# Patient Record
Sex: FEMALE | Race: OTHER | NOT HISPANIC OR LATINO | URBAN - METROPOLITAN AREA
[De-identification: names, ages, dates, MRNs, and addresses within clinical notes are randomized per-mention and may not be internally consistent; named-entity substitution may affect disease eponyms.]

---

## 2021-03-09 ENCOUNTER — INPATIENT (INPATIENT)
Facility: HOSPITAL | Age: 81
LOS: 1 days | Discharge: HOME HEALTH | DRG: 689 | End: 2021-03-11
Attending: HOSPITALIST | Admitting: STUDENT IN AN ORGANIZED HEALTH CARE EDUCATION/TRAINING PROGRAM
Payer: COMMERCIAL

## 2021-03-09 VITALS
DIASTOLIC BLOOD PRESSURE: 65 MMHG | HEART RATE: 93 BPM | WEIGHT: 115.08 LBS | RESPIRATION RATE: 20 BRPM | HEIGHT: 62 IN | TEMPERATURE: 98 F | OXYGEN SATURATION: 100 % | SYSTOLIC BLOOD PRESSURE: 137 MMHG

## 2021-03-09 PROCEDURE — 71045 X-RAY EXAM CHEST 1 VIEW: CPT | Mod: 26

## 2021-03-09 PROCEDURE — 99285 EMERGENCY DEPT VISIT HI MDM: CPT

## 2021-03-09 NOTE — ED ADULT TRIAGE NOTE - MEANS OF ARRIVAL
Mom is here with brother's appt and is requesting refill on his Ritalin. See order.  They still have some left but since she is here can  Rx.   Nika Grossman MD   
stretcher

## 2021-03-09 NOTE — ED PROVIDER NOTE - ATTENDING CONTRIBUTION TO CARE
80 year old female with history of Parkinson disease presented to ED with generalized weakness. Uncleared if pt had a fall at home. Unable to reach son for collateral. Pt is reporting one week of lower abdominal pain, no fever, chills, nausea, vomiting, changes in bowel habits.  Also reports decreased appetitte.     PHYSICAL EXAMINATION:  VITALS REVIEWED.    GENERALIZED APPEARANCE:  Comfortable, resting comfortably   SKIN:  Dry, warm   HEAD:  No obvious scalp lesions  NECK:  Supple, non-tender, no midline tenderness   CHEST AND RESPIRATORY:  Clear to auscultation B/L, good air entry B/L, equal chest expansion  HEART AND CARDIOVASCULAR:  Regular rate, no obvious murmur  ABDOMEN AND GI:  Soft, bilateral lower abdomen tenderness  EXTREMITIES:  No deformity, edema, or calf tenderness  NEURO: AAOx2, gross motor and sensory intact  PSYCH: Normal affect     Impression: Likely UTI vs colitis. Low suspicion for ACS. Plan: labs, CT head, CT a/p reassess

## 2021-03-09 NOTE — ED PROVIDER NOTE - OBJECTIVE STATEMENT
80F with PMH parkinsons disease presenting with weakness. Patient reports that she fell but does not remember how she fell, LOC, or if she hit her head. Patient reports she has felt weak since last night. Per EMS, patient has been in bed all day and last time patient had similar symptoms was in January for which she was prescribed abx to treat UTI. Patient reports chills, no HA, SOB, abdominal pain, n/v/c/d. 80F with PMH parkinsons disease presenting with weakness. Patient reports that she fell but does not remember how she fell, LOC, or if she hit her head. Patient reports she has felt weak since last night. Per EMS, patient has been in bed all day and last time patient had similar symptoms was in January for which she was prescribed abx to treat UTI. Patient reports chills, lower abdominal pain and hip pain bilaterallym no HA, SOB, n/v/c/d. 80F with PMH parkinsons disease presenting with weakness. Patient reports that she fell but does not remember how she fell, LOC, or if she hit her head. Patient reports she has felt weak since last night. Per EMS, patient has been in bed all day and last time patient had similar symptoms was in January for which she was prescribed abx to treat UTI. Patient reports chills, lower abdominal pain and hip pain bilaterallym no HA, SOB, n/v/c/d.    Attempted to call son Landen to obtain collateral but unable to reach.

## 2021-03-09 NOTE — ED PROVIDER NOTE - CLINICAL SUMMARY MEDICAL DECISION MAKING FREE TEXT BOX
80F with PMH parkinsons presenting with weakness. Patient reports she fell, but per EMS patient has been in bed for last 24 hours. Will obtain labs, ekg, imaging, re-assess

## 2021-03-09 NOTE — ED PROVIDER NOTE - MUSCULOSKELETAL NEGATIVE STATEMENT, MLM
no back pain, no gout, no musculoskeletal pain, no neck pain, and no weakness. + hip pain BL; no back pain, no gout, no musculoskeletal pain, no neck pain, and no weakness.

## 2021-03-10 DIAGNOSIS — R31.1 BENIGN ESSENTIAL MICROSCOPIC HEMATURIA: ICD-10-CM

## 2021-03-10 DIAGNOSIS — G20 PARKINSON'S DISEASE: ICD-10-CM

## 2021-03-10 DIAGNOSIS — Z29.9 ENCOUNTER FOR PROPHYLACTIC MEASURES, UNSPECIFIED: ICD-10-CM

## 2021-03-10 DIAGNOSIS — F03.90 UNSPECIFIED DEMENTIA WITHOUT BEHAVIORAL DISTURBANCE: ICD-10-CM

## 2021-03-10 DIAGNOSIS — R41.0 DISORIENTATION, UNSPECIFIED: ICD-10-CM

## 2021-03-10 LAB
24R-OH-CALCIDIOL SERPL-MCNC: 30.3 NG/ML — SIGNIFICANT CHANGE UP (ref 30–80)
A1C WITH ESTIMATED AVERAGE GLUCOSE RESULT: 5.7 % — HIGH (ref 4–5.6)
ALBUMIN SERPL ELPH-MCNC: 4.1 G/DL — SIGNIFICANT CHANGE UP (ref 3.3–5)
ALBUMIN SERPL ELPH-MCNC: 4.4 G/DL — SIGNIFICANT CHANGE UP (ref 3.3–5)
ALP SERPL-CCNC: 50 U/L — SIGNIFICANT CHANGE UP (ref 40–120)
ALP SERPL-CCNC: 51 U/L — SIGNIFICANT CHANGE UP (ref 40–120)
ALT FLD-CCNC: 8 U/L — LOW (ref 10–45)
ALT FLD-CCNC: 8 U/L — LOW (ref 10–45)
ANION GAP SERPL CALC-SCNC: 12 MMOL/L — SIGNIFICANT CHANGE UP (ref 5–17)
ANION GAP SERPL CALC-SCNC: 13 MMOL/L — SIGNIFICANT CHANGE UP (ref 5–17)
APPEARANCE UR: CLEAR — SIGNIFICANT CHANGE UP
AST SERPL-CCNC: 11 U/L — SIGNIFICANT CHANGE UP (ref 10–40)
AST SERPL-CCNC: 14 U/L — SIGNIFICANT CHANGE UP (ref 10–40)
BACTERIA # UR AUTO: ABNORMAL
BASOPHILS # BLD AUTO: 0.02 K/UL — SIGNIFICANT CHANGE UP (ref 0–0.2)
BASOPHILS # BLD AUTO: 0.03 K/UL — SIGNIFICANT CHANGE UP (ref 0–0.2)
BASOPHILS NFR BLD AUTO: 0.2 % — SIGNIFICANT CHANGE UP (ref 0–2)
BASOPHILS NFR BLD AUTO: 0.3 % — SIGNIFICANT CHANGE UP (ref 0–2)
BILIRUB SERPL-MCNC: 0.5 MG/DL — SIGNIFICANT CHANGE UP (ref 0.2–1.2)
BILIRUB SERPL-MCNC: 0.6 MG/DL — SIGNIFICANT CHANGE UP (ref 0.2–1.2)
BILIRUB UR-MCNC: NEGATIVE — SIGNIFICANT CHANGE UP
BUN SERPL-MCNC: 18 MG/DL — SIGNIFICANT CHANGE UP (ref 7–23)
BUN SERPL-MCNC: 21 MG/DL — SIGNIFICANT CHANGE UP (ref 7–23)
CALCIUM SERPL-MCNC: 10.3 MG/DL — SIGNIFICANT CHANGE UP (ref 8.4–10.5)
CALCIUM SERPL-MCNC: 9.7 MG/DL — SIGNIFICANT CHANGE UP (ref 8.4–10.5)
CHLORIDE SERPL-SCNC: 103 MMOL/L — SIGNIFICANT CHANGE UP (ref 96–108)
CHLORIDE SERPL-SCNC: 106 MMOL/L — SIGNIFICANT CHANGE UP (ref 96–108)
CO2 SERPL-SCNC: 21 MMOL/L — LOW (ref 22–31)
CO2 SERPL-SCNC: 25 MMOL/L — SIGNIFICANT CHANGE UP (ref 22–31)
COLOR SPEC: YELLOW — SIGNIFICANT CHANGE UP
CREAT SERPL-MCNC: 0.66 MG/DL — SIGNIFICANT CHANGE UP (ref 0.5–1.3)
CREAT SERPL-MCNC: 0.79 MG/DL — SIGNIFICANT CHANGE UP (ref 0.5–1.3)
DIFF PNL FLD: NEGATIVE — SIGNIFICANT CHANGE UP
EOSINOPHIL # BLD AUTO: 0.04 K/UL — SIGNIFICANT CHANGE UP (ref 0–0.5)
EOSINOPHIL # BLD AUTO: 0.13 K/UL — SIGNIFICANT CHANGE UP (ref 0–0.5)
EOSINOPHIL NFR BLD AUTO: 0.4 % — SIGNIFICANT CHANGE UP (ref 0–6)
EOSINOPHIL NFR BLD AUTO: 1.3 % — SIGNIFICANT CHANGE UP (ref 0–6)
EPI CELLS # UR: 1 — SIGNIFICANT CHANGE UP
ESTIMATED AVERAGE GLUCOSE: 117 MG/DL — HIGH (ref 68–114)
GAS PNL BLDV: SIGNIFICANT CHANGE UP
GLUCOSE SERPL-MCNC: 82 MG/DL — SIGNIFICANT CHANGE UP (ref 70–99)
GLUCOSE SERPL-MCNC: 92 MG/DL — SIGNIFICANT CHANGE UP (ref 70–99)
GLUCOSE UR QL: NEGATIVE — SIGNIFICANT CHANGE UP
HCT VFR BLD CALC: 39.3 % — SIGNIFICANT CHANGE UP (ref 34.5–45)
HCT VFR BLD CALC: 41.9 % — SIGNIFICANT CHANGE UP (ref 34.5–45)
HGB BLD-MCNC: 12.9 G/DL — SIGNIFICANT CHANGE UP (ref 11.5–15.5)
HGB BLD-MCNC: 13.5 G/DL — SIGNIFICANT CHANGE UP (ref 11.5–15.5)
HIV 1+2 AB+HIV1 P24 AG SERPL QL IA: SIGNIFICANT CHANGE UP
HYALINE CASTS # UR AUTO: 1 /LPF — SIGNIFICANT CHANGE UP (ref 0–7)
IMM GRANULOCYTES NFR BLD AUTO: 0.4 % — SIGNIFICANT CHANGE UP (ref 0–1.5)
IMM GRANULOCYTES NFR BLD AUTO: 0.5 % — SIGNIFICANT CHANGE UP (ref 0–1.5)
KETONES UR-MCNC: ABNORMAL
LACTATE BLDV-MCNC: 1.3 MMOL/L — SIGNIFICANT CHANGE UP (ref 0.7–2)
LEUKOCYTE ESTERASE UR-ACNC: NEGATIVE — SIGNIFICANT CHANGE UP
LYMPHOCYTES # BLD AUTO: 2.61 K/UL — SIGNIFICANT CHANGE UP (ref 1–3.3)
LYMPHOCYTES # BLD AUTO: 28.6 % — SIGNIFICANT CHANGE UP (ref 13–44)
LYMPHOCYTES # BLD AUTO: 3.39 K/UL — HIGH (ref 1–3.3)
LYMPHOCYTES # BLD AUTO: 33.6 % — SIGNIFICANT CHANGE UP (ref 13–44)
MAGNESIUM SERPL-MCNC: 2.2 MG/DL — SIGNIFICANT CHANGE UP (ref 1.6–2.6)
MCHC RBC-ENTMCNC: 31.5 PG — SIGNIFICANT CHANGE UP (ref 27–34)
MCHC RBC-ENTMCNC: 32.2 GM/DL — SIGNIFICANT CHANGE UP (ref 32–36)
MCHC RBC-ENTMCNC: 32.2 PG — SIGNIFICANT CHANGE UP (ref 27–34)
MCHC RBC-ENTMCNC: 32.8 GM/DL — SIGNIFICANT CHANGE UP (ref 32–36)
MCV RBC AUTO: 97.9 FL — SIGNIFICANT CHANGE UP (ref 80–100)
MCV RBC AUTO: 98 FL — SIGNIFICANT CHANGE UP (ref 80–100)
MONOCYTES # BLD AUTO: 0.45 K/UL — SIGNIFICANT CHANGE UP (ref 0–0.9)
MONOCYTES # BLD AUTO: 0.57 K/UL — SIGNIFICANT CHANGE UP (ref 0–0.9)
MONOCYTES NFR BLD AUTO: 4.9 % — SIGNIFICANT CHANGE UP (ref 2–14)
MONOCYTES NFR BLD AUTO: 5.6 % — SIGNIFICANT CHANGE UP (ref 2–14)
NEUTROPHILS # BLD AUTO: 5.93 K/UL — SIGNIFICANT CHANGE UP (ref 1.8–7.4)
NEUTROPHILS # BLD AUTO: 5.97 K/UL — SIGNIFICANT CHANGE UP (ref 1.8–7.4)
NEUTROPHILS NFR BLD AUTO: 58.7 % — SIGNIFICANT CHANGE UP (ref 43–77)
NEUTROPHILS NFR BLD AUTO: 65.5 % — SIGNIFICANT CHANGE UP (ref 43–77)
NITRITE UR-MCNC: POSITIVE
NRBC # BLD: 0 /100 WBCS — SIGNIFICANT CHANGE UP (ref 0–0)
NRBC # BLD: 0 /100 WBCS — SIGNIFICANT CHANGE UP (ref 0–0)
PH UR: 6 — SIGNIFICANT CHANGE UP (ref 5–8)
PHOSPHATE SERPL-MCNC: 2.9 MG/DL — SIGNIFICANT CHANGE UP (ref 2.5–4.5)
PLATELET # BLD AUTO: 216 K/UL — SIGNIFICANT CHANGE UP (ref 150–400)
PLATELET # BLD AUTO: 218 K/UL — SIGNIFICANT CHANGE UP (ref 150–400)
POTASSIUM SERPL-MCNC: 3.9 MMOL/L — SIGNIFICANT CHANGE UP (ref 3.5–5.3)
POTASSIUM SERPL-MCNC: 4.2 MMOL/L — SIGNIFICANT CHANGE UP (ref 3.5–5.3)
POTASSIUM SERPL-SCNC: 3.9 MMOL/L — SIGNIFICANT CHANGE UP (ref 3.5–5.3)
POTASSIUM SERPL-SCNC: 4.2 MMOL/L — SIGNIFICANT CHANGE UP (ref 3.5–5.3)
PROT SERPL-MCNC: 7.6 G/DL — SIGNIFICANT CHANGE UP (ref 6–8.3)
PROT SERPL-MCNC: 7.8 G/DL — SIGNIFICANT CHANGE UP (ref 6–8.3)
PROT UR-MCNC: ABNORMAL
RBC # BLD: 4.01 M/UL — SIGNIFICANT CHANGE UP (ref 3.8–5.2)
RBC # BLD: 4.28 M/UL — SIGNIFICANT CHANGE UP (ref 3.8–5.2)
RBC # FLD: 14.7 % — HIGH (ref 10.3–14.5)
RBC # FLD: 14.8 % — HIGH (ref 10.3–14.5)
RBC CASTS # UR COMP ASSIST: 3 /HPF — SIGNIFICANT CHANGE UP (ref 0–4)
SARS-COV-2 IGG SERPL QL IA: NEGATIVE — SIGNIFICANT CHANGE UP
SARS-COV-2 IGM SERPL IA-ACNC: 0.06 INDEX — SIGNIFICANT CHANGE UP
SARS-COV-2 RNA SPEC QL NAA+PROBE: SIGNIFICANT CHANGE UP
SODIUM SERPL-SCNC: 139 MMOL/L — SIGNIFICANT CHANGE UP (ref 135–145)
SODIUM SERPL-SCNC: 141 MMOL/L — SIGNIFICANT CHANGE UP (ref 135–145)
SP GR SPEC: 1.05 — HIGH (ref 1.01–1.02)
TROPONIN T, HIGH SENSITIVITY RESULT: 19 NG/L — SIGNIFICANT CHANGE UP (ref 0–51)
TROPONIN T, HIGH SENSITIVITY RESULT: 20 NG/L — SIGNIFICANT CHANGE UP (ref 0–51)
TSH SERPL-MCNC: 0.85 UIU/ML — SIGNIFICANT CHANGE UP (ref 0.27–4.2)
UROBILINOGEN FLD QL: NEGATIVE — SIGNIFICANT CHANGE UP
VIT B12 SERPL-MCNC: 540 PG/ML — SIGNIFICANT CHANGE UP (ref 232–1245)
WBC # BLD: 10.1 K/UL — SIGNIFICANT CHANGE UP (ref 3.8–10.5)
WBC # BLD: 9.13 K/UL — SIGNIFICANT CHANGE UP (ref 3.8–10.5)
WBC # FLD AUTO: 10.1 K/UL — SIGNIFICANT CHANGE UP (ref 3.8–10.5)
WBC # FLD AUTO: 9.13 K/UL — SIGNIFICANT CHANGE UP (ref 3.8–10.5)
WBC UR QL: 3 /HPF — SIGNIFICANT CHANGE UP (ref 0–5)

## 2021-03-10 PROCEDURE — 70450 CT HEAD/BRAIN W/O DYE: CPT | Mod: 26,ME

## 2021-03-10 PROCEDURE — G1004: CPT

## 2021-03-10 PROCEDURE — 74177 CT ABD & PELVIS W/CONTRAST: CPT | Mod: 26,ME

## 2021-03-10 PROCEDURE — 99223 1ST HOSP IP/OBS HIGH 75: CPT | Mod: GC

## 2021-03-10 RX ORDER — SODIUM CHLORIDE 9 MG/ML
1000 INJECTION INTRAMUSCULAR; INTRAVENOUS; SUBCUTANEOUS
Refills: 0 | Status: COMPLETED | OUTPATIENT
Start: 2021-03-10 | End: 2021-03-10

## 2021-03-10 RX ORDER — AMANTADINE HCL 100 MG
2 CAPSULE ORAL
Qty: 0 | Refills: 0 | DISCHARGE

## 2021-03-10 RX ORDER — RIVASTIGMINE 4.6 MG/24H
1 PATCH, EXTENDED RELEASE TRANSDERMAL
Qty: 0 | Refills: 0 | DISCHARGE

## 2021-03-10 RX ORDER — CARBIDOPA AND LEVODOPA 25; 100 MG/1; MG/1
1 TABLET ORAL
Qty: 0 | Refills: 0 | DISCHARGE

## 2021-03-10 RX ORDER — ENTACAPONE 200 MG/1
200 TABLET, FILM COATED ORAL THREE TIMES A DAY
Refills: 0 | Status: DISCONTINUED | OUTPATIENT
Start: 2021-03-10 | End: 2021-03-11

## 2021-03-10 RX ORDER — CARBIDOPA AND LEVODOPA 25; 100 MG/1; MG/1
1 TABLET ORAL THREE TIMES A DAY
Refills: 0 | Status: DISCONTINUED | OUTPATIENT
Start: 2021-03-10 | End: 2021-03-11

## 2021-03-10 RX ORDER — AMANTADINE HCL 100 MG
200 CAPSULE ORAL
Refills: 0 | Status: DISCONTINUED | OUTPATIENT
Start: 2021-03-10 | End: 2021-03-11

## 2021-03-10 RX ORDER — QUETIAPINE FUMARATE 200 MG/1
25 TABLET, FILM COATED ORAL AT BEDTIME
Refills: 0 | Status: DISCONTINUED | OUTPATIENT
Start: 2021-03-10 | End: 2021-03-10

## 2021-03-10 RX ORDER — CEFTRIAXONE 500 MG/1
1000 INJECTION, POWDER, FOR SOLUTION INTRAMUSCULAR; INTRAVENOUS EVERY 24 HOURS
Refills: 0 | Status: DISCONTINUED | OUTPATIENT
Start: 2021-03-10 | End: 2021-03-11

## 2021-03-10 RX ORDER — SODIUM CHLORIDE 9 MG/ML
1000 INJECTION INTRAMUSCULAR; INTRAVENOUS; SUBCUTANEOUS ONCE
Refills: 0 | Status: COMPLETED | OUTPATIENT
Start: 2021-03-10 | End: 2021-03-10

## 2021-03-10 RX ORDER — ENOXAPARIN SODIUM 100 MG/ML
40 INJECTION SUBCUTANEOUS DAILY
Refills: 0 | Status: DISCONTINUED | OUTPATIENT
Start: 2021-03-10 | End: 2021-03-10

## 2021-03-10 RX ORDER — ENTACAPONE 200 MG/1
1 TABLET, FILM COATED ORAL
Qty: 0 | Refills: 0 | DISCHARGE

## 2021-03-10 RX ORDER — ENTACAPONE 200 MG/1
0 TABLET, FILM COATED ORAL
Qty: 0 | Refills: 0 | DISCHARGE

## 2021-03-10 RX ORDER — ENOXAPARIN SODIUM 100 MG/ML
40 INJECTION SUBCUTANEOUS DAILY
Refills: 0 | Status: DISCONTINUED | OUTPATIENT
Start: 2021-03-10 | End: 2021-03-11

## 2021-03-10 RX ORDER — LANOLIN ALCOHOL/MO/W.PET/CERES
3 CREAM (GRAM) TOPICAL AT BEDTIME
Refills: 0 | Status: DISCONTINUED | OUTPATIENT
Start: 2021-03-10 | End: 2021-03-11

## 2021-03-10 RX ADMIN — ENTACAPONE 200 MILLIGRAM(S): 200 TABLET, FILM COATED ORAL at 22:11

## 2021-03-10 RX ADMIN — ENOXAPARIN SODIUM 40 MILLIGRAM(S): 100 INJECTION SUBCUTANEOUS at 16:34

## 2021-03-10 RX ADMIN — CARBIDOPA AND LEVODOPA 1 TABLET(S): 25; 100 TABLET ORAL at 22:12

## 2021-03-10 RX ADMIN — SODIUM CHLORIDE 75 MILLILITER(S): 9 INJECTION INTRAMUSCULAR; INTRAVENOUS; SUBCUTANEOUS at 11:20

## 2021-03-10 RX ADMIN — CEFTRIAXONE 100 MILLIGRAM(S): 500 INJECTION, POWDER, FOR SOLUTION INTRAMUSCULAR; INTRAVENOUS at 05:45

## 2021-03-10 RX ADMIN — Medication 3 MILLIGRAM(S): at 22:06

## 2021-03-10 RX ADMIN — Medication 200 MILLIGRAM(S): at 23:45

## 2021-03-10 RX ADMIN — SODIUM CHLORIDE 1000 MILLILITER(S): 9 INJECTION INTRAMUSCULAR; INTRAVENOUS; SUBCUTANEOUS at 02:49

## 2021-03-10 NOTE — PHYSICAL THERAPY INITIAL EVALUATION ADULT - DISCHARGE DISPOSITION, PT EVAL
for balance, gait and strengthening and assistance for ALL functional mobility/activities/home w/ home PT

## 2021-03-10 NOTE — H&P ADULT - ATTENDING COMMENTS
Patient seen and examined at bedside, d/w resident and agree with plan as above.  79 yo F w/ PMH of advanced Parkinsons disease, dementia (dependent on ADLs), HLD, a/w metabolic encephalopathy and weakness likely due to UTI.   c/w ceftriaxone (started 3/9) for UTI, f/u Urine culture  follow up blood culture  No other focal source, no cough/fever/no WBC - CXR clear  PD - c/w medications as above   presents to the ED due to lethargy. UA + for few bacteria and nitrites. Patient also on multiple sedating medications.  Metabolic encephalopathy - more awake this morning after fluids/antibiotics, supportive care  Dysphagia - failed bedside eval initially, however now more awake, will d/w family goals for feeding but would favor starting careful hand feeding with aspiration precautions.  Will d/w son regarding advanced directives/goals of care Patient seen and examined at bedside, d/w resident and agree with plan as above.  81 yo F w/ PMH of advanced Parkinsons disease, dementia (dependent on ADLs), HLD, a/w metabolic encephalopathy and weakness likely due to UTI.   c/w ceftriaxone (started 3/9) for UTI, f/u Urine culture  follow up blood culture  No other focal source, no cough/fever/no WBC - CXR clear  PD - c/w medications as above   presents to the ED due to lethargy. UA + for few bacteria and nitrites. Patient also on multiple sedating medications.  Dementia with behavioral symptoms - has been calm here, but given lethargy, consider decreasing PM dose to 25mg and not 50.   Metabolic encephalopathy - more awake this morning after fluids/antibiotics, supportive care  Dysphagia - failed bedside eval initially, however now more awake, will d/w family goals for feeding but would favor starting careful hand feeding with aspiration precautions.  Will d/w son regarding advanced directives/goals of care Patient seen and examined at bedside, d/w resident and agree with plan as above.  81 yo F w/ PMH of advanced Parkinsons disease, dementia (dependent on ADLs), HLD, a/w metabolic encephalopathy and weakness likely due to UTI.   c/w ceftriaxone (started 3/9) for UTI, f/u Urine culture  follow up blood culture  No other focal source, no cough/fever/no WBC - CXR clear  PD - c/w medications as above   presents to the ED due to lethargy. UA + for few bacteria and nitrites. Patient also on multiple sedating medications.  Dementia with behavioral symptoms - has been calm here, but given lethargy, consider decreasing PM dose to 25mg and not 50.   Metabolic encephalopathy - more awake this morning after fluids/antibiotics, supportive care  Dysphagia - failed bedside eval initially, however now more awake, will d/w family goals for feeding but would favor starting careful hand feeding with aspiration precautions.  Will d/w son regarding advanced directives/goals of care  Given frailty, poor skin turgor - high risk of pressure ulcers - frequent turning and positioning q2hrs. Patient seen and examined at bedside, d/w resident and agree with plan as above.  81 yo F w/ PMH of advanced Parkinsons disease, dementia (dependent on ADLs), HLD, a/w metabolic encephalopathy and weakness likely due to UTI.   c/w ceftriaxone (started 3/9) for UTI, f/u Urine culture  follow up blood culture  No other focal source, no cough/fever/no WBC - CXR clear  PD - c/w home medications - entacapone, amantadine, sinemet   presents to the ED due to lethargy. UA + for few bacteria and nitrites. Patient also on multiple sedating medications.  Dementia with behavioral symptoms - has been calm here, but given lethargy, consider decreasing PM dose to 25mg and not 50.   Metabolic encephalopathy - more awake this morning after fluids/antibiotics, supportive care  Dysphagia - failed bedside eval initially, however now more awake, will d/w family goals for feeding but would favor starting careful hand feeding with aspiration precautions.  Will d/w son regarding advanced directives/goals of care  Given frailty, poor skin turgor - high risk of pressure ulcers - frequent turning and positioning q2hrs.

## 2021-03-10 NOTE — H&P ADULT - NSHPLABSRESULTS_GEN_ALL_CORE
Labs:                        12.9   9.13  )-----------( 216      ( 10 Mar 2021 08:52 )             39.3     Auto Eosinophil # 0.04  / Auto Eosinophil % 0.4   / Auto Neutrophil # 5.97  / Auto Neutrophil % 65.5  / BANDS % x                            13.5   10.10 )-----------( 218      ( 09 Mar 2021 23:56 )             41.9     Auto Eosinophil # 0.13  / Auto Eosinophil % 1.3   / Auto Neutrophil # 5.93  / Auto Neutrophil % 58.7  / BANDS % x        Hgb Trend: 12.9<--, 13.5<--    03-10    139  |  106  |  18  ----------------------------<  82  4.2   |  21<L>  |  0.66  03-09    141  |  103  |  21  ----------------------------<  92  3.9   |  25  |  0.79    Ca    9.7      10 Mar 2021 08:52  Mg     2.2     03-10  Phos  2.9     03-10  TPro  7.6  /  Alb  4.1  /  TBili  0.5  /  DBili  x   /  AST  14  /  ALT  8<L>  /  AlkPhos  51  03-10  TPro  7.8  /  Alb  4.4  /  TBili  0.6  /  DBili  x   /  AST  11  /  ALT  8<L>  /  AlkPhos  50  03-09    Creatinine Trend: 0.66<--, 0.79<--        Urinalysis Basic - ( 10 Mar 2021 03:08 )    Color: Yellow / Appearance: Clear / S.050 / pH: x  Gluc: x / Ketone: Small  / Bili: Negative / Urobili: Negative   Blood: x / Protein: Trace / Nitrite: Positive   Leuk Esterase: Negative / RBC: 3 /hpf / WBC 3 /HPF   Sq Epi: x / Non Sq Epi: 1 / Bacteria: Few        ABG:   VENT:       Labs result reviewed by me.

## 2021-03-10 NOTE — H&P ADULT - NSHPREVIEWOFSYSTEMS_GEN_ALL_CORE
CONSTITUTIONAL:  No weight loss, fever, chills, weakness or fatigue.  HEENT:  Eyes:  No visual loss, blurred vision, double vision or yellow sclerae. Ears, Nose, Throat:  No hearing loss, sneezing, congestion, runny nose or sore throat.  SKIN:  No rash or itching.  CARDIOVASCULAR:  No chest pain, chest pressure or chest discomfort. No palpitations.  RESPIRATORY:  No shortness of breath, cough or sputum.  GASTROINTESTINAL:  No anorexia, nausea, vomiting or diarrhea. No abdominal pain or blood.  GENITOURINARY:  Denies hematuria, dysuria.   NEUROLOGICAL:  + AMS  MUSCULOSKELETAL:  No muscle, back pain, joint pain or stiffness.  HEMATOLOGIC:  No anemia, bleeding or bruising.  LYMPHATICS:  No enlarged nodes.   PSYCHIATRIC:  No history of depression or anxiety.  ENDOCRINOLOGIC:  No reports of sweating, cold or heat intolerance. No polyuria or polydipsia.  ALLERGIES:  No history of asthma, hives, eczema or rhinitis.

## 2021-03-10 NOTE — ED ADULT NURSE REASSESSMENT NOTE - NS ED NURSE REASSESS COMMENT FT1
Straight urinary catheter inserted using sterile technique. Procedure, risks, and benefits of catheter explained to patient, patient verbalized understanding. Second RN present to confirm sterility. Pt tolerated well. Urinary catheter drained 100 mL  clear yellow urine, no clots visualized. Urinalysis and urine cultures obtained. Catheter removed promptly.

## 2021-03-10 NOTE — H&P ADULT - NSHPPHYSICALEXAM_GEN_ALL_CORE
T(C): 36.6 (03-10-21 @ 08:09), Max: 36.9 (03-09-21 @ 22:38)  HR: 72 (03-10-21 @ 08:09) (72 - 100)  BP: 146/84 (03-10-21 @ 08:09) (137/65 - 170/81)  RR: 18 (03-10-21 @ 08:09) (16 - 20)  SpO2: 100% (03-10-21 @ 08:09) (99% - 100%)    GENERAL: Laying comfortably, Not responsive. Opens eyes to sternal rub, not following commands  EYES: EOMI, PERRL, no scleral icterus  NECK: No JVD  LUNG: Clear to auscultation bilaterally; No wheeze, crackles or rhonci  HEART: Regular rate and rhythm; No murmurs, rubs, or gallops  ABDOMEN: Soft, Nontender, Nondistended  EXTREMITIES:  No LE edema, Peripheral Pulses intact, No clubbing, cyanosis, or edema  PSYCH: AAOx0, not responsive opens eyes to sternal rub  NEUROLOGY: non-focal, unable to assess strength/sensation  SKIN: No rashes or lesions

## 2021-03-10 NOTE — H&P ADULT - HISTORY OF PRESENT ILLNESS
80 y.o F w/ PMH of Parkinsons disease, dementia, HLD presents to the ED due to lethargy. Patient is unable to give me history, history was obtained by son Landen Tee ([phone]). According to son, patient did not wake up yesterday morning and family could not wake her and called 911. Patient was brought in by ambulance. Patient usually wakes up at 9am, speaks and follows commands, walks with assistance. Son denies any recent falls, fever, chills, N/V/D, cough, SOB, dysuria. States this has happened before in January of this year, went to PCP and was found to have a UTI and improved with antibiotics. Denies any recent travel/sick contacts. 80 y.o F w/ PMH of Parkinsons disease, dementia, HLD presents to the ED due to lethargy. Patient is unable to give me history, history was obtained by son Landen Tee ([phone]). According to son, patient did not wake up yesterday morning and family could not wake her and called 911. Patient was brought in by ambulance. Patient usually wakes up at 9am, speaks and follows commands, walks with assistance. Son denies any recent falls, fever, chills, N/V/D, cough, SOB, dysuria. States this has happened before in January of this year, went to PCP and was found to have a UTI and improved with antibiotics. Denies any recent travel/sick contacts.    ED course: Vitals WNL. Ceftriaxone x 1, 1L Bolus

## 2021-03-10 NOTE — ED ADULT NURSE NOTE - OBJECTIVE STATEMENT
80y F arrived to the ED via EMS c/o weakness. As per EMS pt family reports pt has be weak and " stayed in bed all day today." Pt PMH parkinson's, pt primarily mandarin speaking  used with MD Eaton. Patient A&Ox1 reports that she fell but does not remember how she fell, LOC, or if she hit her head. Patient reports she has felt weak since last night. Patient reports chills, lower abdominal pain and hip pain bilaterally no HA, SOB, n/v/c/d.

## 2021-03-10 NOTE — PROVIDER CONTACT NOTE (OTHER) - ASSESSMENT
Pt A&Ox0-lethargic at baseline. 2 RN attempted IV multiple times w/o success. Pt received IV fluids and stable at this time.

## 2021-03-10 NOTE — H&P ADULT - PROBLEM SELECTOR PLAN 3
According to son, baseline AOx1-2, communicates in mandarin, responds to commands  --continue to monitor

## 2021-03-10 NOTE — H&P ADULT - PROBLEM SELECTOR PLAN 1
Hypoactive delirium. Possibly 2/2 UTI vs multiple sedating medications vs progression of parkinsons  --c/w Ceftriaxone x 3 days (3/10- )  --hold sedating medications for now  --f/u TSH, B12, RPR, HIV  --f/u UCx, BCx

## 2021-03-10 NOTE — H&P ADULT - ASSESSMENT
80 y.o F w/ PMH of Parkinsons disease, dementia, HLD presents to the ED due to lethargy. UA + for few bacteria and nitrites. Patient also on multiple sedating medications.

## 2021-03-10 NOTE — PHYSICAL THERAPY INITIAL EVALUATION ADULT - ADDITIONAL COMMENTS
CT head:(-); CXR: (-); CT abd: (-)    social history: pt poor historian, h/o dementia/confused. spoke with son Landen. pt lives with , son and sister in private house, no stairs to negotiate. pt owns rolling walker. son states family assists with all ADLs/ambulation prior to hospitalization

## 2021-03-10 NOTE — PHYSICAL THERAPY INITIAL EVALUATION ADULT - ACTIVE RANGE OF MOTION EXAMINATION, REHAB EVAL
B shoudler flex~80 degrees/bilateral upper extremity Active ROM was WFL (within functional limits)/bilateral  lower extremity Active ROM was WFL (within functional limits)

## 2021-03-11 VITALS
HEART RATE: 99 BPM | OXYGEN SATURATION: 95 % | SYSTOLIC BLOOD PRESSURE: 128 MMHG | RESPIRATION RATE: 18 BRPM | TEMPERATURE: 98 F | DIASTOLIC BLOOD PRESSURE: 81 MMHG

## 2021-03-11 LAB
ANION GAP SERPL CALC-SCNC: 14 MMOL/L — SIGNIFICANT CHANGE UP (ref 5–17)
BUN SERPL-MCNC: 22 MG/DL — SIGNIFICANT CHANGE UP (ref 7–23)
CALCIUM SERPL-MCNC: 10 MG/DL — SIGNIFICANT CHANGE UP (ref 8.4–10.5)
CHLORIDE SERPL-SCNC: 107 MMOL/L — SIGNIFICANT CHANGE UP (ref 96–108)
CO2 SERPL-SCNC: 24 MMOL/L — SIGNIFICANT CHANGE UP (ref 22–31)
CREAT SERPL-MCNC: 0.65 MG/DL — SIGNIFICANT CHANGE UP (ref 0.5–1.3)
GLUCOSE SERPL-MCNC: 89 MG/DL — SIGNIFICANT CHANGE UP (ref 70–99)
HCT VFR BLD CALC: 38.8 % — SIGNIFICANT CHANGE UP (ref 34.5–45)
HGB BLD-MCNC: 12.8 G/DL — SIGNIFICANT CHANGE UP (ref 11.5–15.5)
MAGNESIUM SERPL-MCNC: 2.2 MG/DL — SIGNIFICANT CHANGE UP (ref 1.6–2.6)
MCHC RBC-ENTMCNC: 32.4 PG — SIGNIFICANT CHANGE UP (ref 27–34)
MCHC RBC-ENTMCNC: 33 GM/DL — SIGNIFICANT CHANGE UP (ref 32–36)
MCV RBC AUTO: 98.2 FL — SIGNIFICANT CHANGE UP (ref 80–100)
NRBC # BLD: 0 /100 WBCS — SIGNIFICANT CHANGE UP (ref 0–0)
PHOSPHATE SERPL-MCNC: 2.7 MG/DL — SIGNIFICANT CHANGE UP (ref 2.5–4.5)
PLATELET # BLD AUTO: 206 K/UL — SIGNIFICANT CHANGE UP (ref 150–400)
POTASSIUM SERPL-MCNC: 3.7 MMOL/L — SIGNIFICANT CHANGE UP (ref 3.5–5.3)
POTASSIUM SERPL-SCNC: 3.7 MMOL/L — SIGNIFICANT CHANGE UP (ref 3.5–5.3)
RBC # BLD: 3.95 M/UL — SIGNIFICANT CHANGE UP (ref 3.8–5.2)
RBC # FLD: 14.7 % — HIGH (ref 10.3–14.5)
SODIUM SERPL-SCNC: 145 MMOL/L — SIGNIFICANT CHANGE UP (ref 135–145)
T PALLIDUM AB TITR SER: NEGATIVE — SIGNIFICANT CHANGE UP
WBC # BLD: 8.86 K/UL — SIGNIFICANT CHANGE UP (ref 3.8–10.5)
WBC # FLD AUTO: 8.86 K/UL — SIGNIFICANT CHANGE UP (ref 3.8–10.5)

## 2021-03-11 PROCEDURE — 87389 HIV-1 AG W/HIV-1&-2 AB AG IA: CPT

## 2021-03-11 PROCEDURE — 82435 ASSAY OF BLOOD CHLORIDE: CPT

## 2021-03-11 PROCEDURE — 86769 SARS-COV-2 COVID-19 ANTIBODY: CPT

## 2021-03-11 PROCEDURE — 84100 ASSAY OF PHOSPHORUS: CPT

## 2021-03-11 PROCEDURE — 82607 VITAMIN B-12: CPT

## 2021-03-11 PROCEDURE — 82330 ASSAY OF CALCIUM: CPT

## 2021-03-11 PROCEDURE — 71045 X-RAY EXAM CHEST 1 VIEW: CPT

## 2021-03-11 PROCEDURE — 74177 CT ABD & PELVIS W/CONTRAST: CPT

## 2021-03-11 PROCEDURE — 83036 HEMOGLOBIN GLYCOSYLATED A1C: CPT

## 2021-03-11 PROCEDURE — 82803 BLOOD GASES ANY COMBINATION: CPT

## 2021-03-11 PROCEDURE — 80048 BASIC METABOLIC PNL TOTAL CA: CPT

## 2021-03-11 PROCEDURE — 87635 SARS-COV-2 COVID-19 AMP PRB: CPT

## 2021-03-11 PROCEDURE — 84132 ASSAY OF SERUM POTASSIUM: CPT

## 2021-03-11 PROCEDURE — 87040 BLOOD CULTURE FOR BACTERIA: CPT

## 2021-03-11 PROCEDURE — 85027 COMPLETE CBC AUTOMATED: CPT

## 2021-03-11 PROCEDURE — 83735 ASSAY OF MAGNESIUM: CPT

## 2021-03-11 PROCEDURE — 86780 TREPONEMA PALLIDUM: CPT

## 2021-03-11 PROCEDURE — U0005: CPT

## 2021-03-11 PROCEDURE — 99239 HOSP IP/OBS DSCHRG MGMT >30: CPT

## 2021-03-11 PROCEDURE — 96374 THER/PROPH/DIAG INJ IV PUSH: CPT

## 2021-03-11 PROCEDURE — 87086 URINE CULTURE/COLONY COUNT: CPT

## 2021-03-11 PROCEDURE — 85025 COMPLETE CBC W/AUTO DIFF WBC: CPT

## 2021-03-11 PROCEDURE — 99285 EMERGENCY DEPT VISIT HI MDM: CPT | Mod: 25

## 2021-03-11 PROCEDURE — 84295 ASSAY OF SERUM SODIUM: CPT

## 2021-03-11 PROCEDURE — 87186 SC STD MICRODIL/AGAR DIL: CPT

## 2021-03-11 PROCEDURE — 84484 ASSAY OF TROPONIN QUANT: CPT

## 2021-03-11 PROCEDURE — 82306 VITAMIN D 25 HYDROXY: CPT

## 2021-03-11 PROCEDURE — 81001 URINALYSIS AUTO W/SCOPE: CPT

## 2021-03-11 PROCEDURE — 83605 ASSAY OF LACTIC ACID: CPT

## 2021-03-11 PROCEDURE — 80053 COMPREHEN METABOLIC PANEL: CPT

## 2021-03-11 PROCEDURE — 82947 ASSAY GLUCOSE BLOOD QUANT: CPT

## 2021-03-11 PROCEDURE — 70450 CT HEAD/BRAIN W/O DYE: CPT

## 2021-03-11 PROCEDURE — 84443 ASSAY THYROID STIM HORMONE: CPT

## 2021-03-11 PROCEDURE — 85014 HEMATOCRIT: CPT

## 2021-03-11 PROCEDURE — 97161 PT EVAL LOW COMPLEX 20 MIN: CPT

## 2021-03-11 PROCEDURE — 85018 HEMOGLOBIN: CPT

## 2021-03-11 RX ORDER — QUETIAPINE FUMARATE 200 MG/1
1 TABLET, FILM COATED ORAL
Qty: 0 | Refills: 0 | DISCHARGE

## 2021-03-11 RX ADMIN — CEFTRIAXONE 100 MILLIGRAM(S): 500 INJECTION, POWDER, FOR SOLUTION INTRAMUSCULAR; INTRAVENOUS at 05:05

## 2021-03-11 RX ADMIN — ENTACAPONE 200 MILLIGRAM(S): 200 TABLET, FILM COATED ORAL at 12:55

## 2021-03-11 RX ADMIN — CARBIDOPA AND LEVODOPA 1 TABLET(S): 25; 100 TABLET ORAL at 12:55

## 2021-03-11 RX ADMIN — CARBIDOPA AND LEVODOPA 1 TABLET(S): 25; 100 TABLET ORAL at 05:05

## 2021-03-11 RX ADMIN — Medication 200 MILLIGRAM(S): at 05:05

## 2021-03-11 RX ADMIN — ENTACAPONE 200 MILLIGRAM(S): 200 TABLET, FILM COATED ORAL at 05:05

## 2021-03-11 RX ADMIN — ENOXAPARIN SODIUM 40 MILLIGRAM(S): 100 INJECTION SUBCUTANEOUS at 11:31

## 2021-03-11 NOTE — DISCHARGE NOTE PROVIDER - NSRESEARCHGRANT_HIDDEN_GEN_A_CORE
Date: 11/19/2018    Time: 12:21 AM    Patient Placed On BIPAP/CPAP/ Non-Invasive Ventilation? No    If no must comment. Facial area red/color change? No           If YES are Blister/Lesion present? No   If yes must notify nursing staff  BIPAP/CPAP skin barrier?   Yes    Skin barrier type:Liquicel       Comments: Remains on from previous shift        [facility] Yes

## 2021-03-11 NOTE — DISCHARGE NOTE PROVIDER - NSDCFUADDAPPT_GEN_ALL_CORE_FT
Please follow up with PCP Dr. Kim Mccloud ([phone]) 1-2 weeks after discharge.  Please follow up with Neurologist: Dr. Gisselle Singer ([phone]) 1-2 weeks after discharge.

## 2021-03-11 NOTE — DISCHARGE NOTE PROVIDER - HOSPITAL COURSE
80 y.o F w/ PMH of Parkinsons disease, dementia, HLD presents to the ED due to lethargy. Patient is unable to give me history, history was obtained by son Landen Tee ([phone]). According to son, patient did not wake up yesterday morning and family could not wake her and called 911. Patient was brought in by ambulance. Patient usually wakes up at 9am, speaks and follows commands, walks with assistance. Son denies any recent falls, fever, chills, N/V/D, cough, SOB, dysuria. States this has happened before in January of this year, went to PCP and was found to have a UTI and improved with antibiotics. Denies any recent travel/sick contacts.    ED course: Vitals WNL. Ceftriaxone x 1, 1L Bolus    ED course: Vitals WNL. Ceftriaxone x 1, 1L Bolus    Patient was admitted for delirium 2/2 UTI. CBC, CMP, Lactate WNL. UA pos for few bacteria and nitrites. COVID negative, TSH, B12, Vit D wnl. CXR clear. CT  Head-  No acute intracranial hemorrhage. CT  Abdomen:  No acute abdominal pathology. Blood and urine cultures show NGTD. Seroquel was held. Patient improved the next day. Physical therapy was consulted and recommended home with home physical therapy. Patient should take cefpedoxime 100mg twice a day for 1 day on 3/12/21 to complete a 3 day course of antibiotics for UTI. Patient should stop taking Seroquel if the patient is not agitated as this may be a cause of oversedation. Patient should follow up with PCP and Neurologist 1-2 weeks after discharge. Patient is hemodynamically stable and ready for discharge.   80 y.o F w/ PMH of Parkinsons disease, dementia, HLD presents to the ED due to lethargy. Patient is unable to give me history, history was obtained by son Landen Tee ([phone]). According to son, patient did not wake up yesterday morning and family could not wake her and called 911. Patient was brought in by ambulance. Patient usually wakes up at 9am, speaks and follows commands, walks with assistance. Son denies any recent falls, fever, chills, N/V/D, cough, SOB, dysuria. States this has happened before in January of this year, went to PCP and was found to have a UTI and improved with antibiotics. Denies any recent travel/sick contacts.    ED course: Vitals WNL. Ceftriaxone x 1, 1L Bolus    ED course: Vitals WNL. Ceftriaxone x 1, 1L Bolus    Patient was admitted for delirium 2/2 UTI. CBC, CMP, Lactate WNL. UA pos for few bacteria and nitrites. COVID negative, TSH, B12, Vit D wnl. CXR clear. CT  Head-  No acute intracranial hemorrhage. CT  Abdomen:  No acute abdominal pathology. Blood and urine cultures show NGTD. Seroquel was held. Patient improved the next day. Physical therapy was consulted and recommended home with home physical therapy. Patient should take cefpedoxime 100mg twice a day for 1 day on 3/12/21 to complete a 3 day course of antibiotics for UTI. Patient should stop taking Seroquel if the patient is not agitated as this may be a cause of oversedation. Patient should follow up with PCP and Neurologist 1-2 weeks after discharge. Patient is hemodynamically stable and ready for discharge.    80yoF hx advanced parkinsons disease and PD related dementia, dependent on ADLs with 24HHA a/w metabolic encephalopathy due to likely UTI, improved with fluids, antibiotics, to complete 3 day course. Patient returned to baseline, son wants to take her home.

## 2021-03-11 NOTE — DISCHARGE NOTE PROVIDER - NSDCMRMEDTOKEN_GEN_ALL_CORE_FT
amantadine 100 mg oral capsule: 2 cap(s) orally 2 times a day  cefpodoxime 100 mg oral tablet: 1 tab(s) orally 2 times a day   entacapone 200 mg oral tablet:   entacapone 200 mg oral tablet: 1 tab(s) orally 3 times a day  rivastigmine 6 mg oral capsule: 1 cap(s) orally 2 times a day  Sinemet 25 mg-250 mg oral tablet: 1 tab(s) orally 3 times a day   amantadine 100 mg oral capsule: 2 cap(s) orally 2 times a day  cefpodoxime 100 mg oral tablet: 1 tab(s) orally 2 times a day   entacapone 200 mg oral tablet:   entacapone 200 mg oral tablet: 1 tab(s) orally 3 times a day  rivastigmine 6 mg oral capsule: 1 cap(s) orally 2 times a day  Sinemet 25 mg-250 mg oral tablet: 1 tab(s) orally 3 times a day  [employer] in [employer]:

## 2021-03-11 NOTE — DISCHARGE NOTE NURSING/CASE MANAGEMENT/SOCIAL WORK - NSDCFUADDAPPT_GEN_ALL_CORE_FT
Please follow up with PCP Dr. Kim Mccloud ([phone]) 1-2 weeks after discharge.  Please follow up with Neurologist: Dr. Gisselle Singer ([phone]) 1-2 weeks after discharge.

## 2021-03-11 NOTE — PROGRESS NOTE ADULT - ATTENDING COMMENTS
Patient seen and examined at bedside, d/w resident and agree with plan as above.  79 yo F w/ PMH of advanced Parkinsons disease, dementia (dependent on ADLs), HLD a/w metabolic encephalopathy and weakness likely due to UTI.   c/w ceftriaxone (day 2) for UTI, f/u Urine culture  Bcx x2  No other focal source, no cough/fever/no WBC - CXR clear  d/w son - spoke to patient on phone, appears at baseline, would like to take her home. Patient has a niece that cares for her at home.  PD - c/w home medications - entacapone, amantadine, sinemet  Dementia with behavioral symptoms - held seroquel due to lethargy. Will d/w son to consider cutting back at home if not as agitated  Dysphagia - failed initial s/s however after family discussion, chose to continue oral feeds  Plan for d/c home today.   Discharge time 40min  Given frailty, poor skin turgor - high risk of pressure ulcers - frequent turning and positioning q2hrs.

## 2021-03-11 NOTE — PROGRESS NOTE ADULT - PROBLEM SELECTOR PLAN 3
According to son, baseline AOx1-2, communicates in mandarin, responds to commands  --seems to be back to baseline

## 2021-03-11 NOTE — PROGRESS NOTE ADULT - SUBJECTIVE AND OBJECTIVE BOX
Kristofer Ann MD  PGY 1 Department of Internal Medicine  Pager: [phone] ([facility])   Pager: [phone] ([facility])  Also available on Microsoft Teams      Patient is a 80y old  Female who presents with a chief complaint of Altered Mental Status (10 Mar 2021 09:40)      SUBJECTIVE / OVERNIGHT EVENTS: No acute overnight events. Pt seen and examined. Patient responsive, but unable to assess ROS d/t mental status. Sometimes responding to mandarin , states she is [facility] and not in the hospital.    MEDICATIONS  (STANDING):  amantadine 200 milliGRAM(s) Oral two times a day  carbidopa/levodopa  25/250 1 Tablet(s) Oral three times a day  cefTRIAXone   IVPB 1000 milliGRAM(s) IV Intermittent every 24 hours  enoxaparin Injectable 40 milliGRAM(s) SubCutaneous daily  entacapone 200 milliGRAM(s) Oral three times a day  melatonin 3 milliGRAM(s) Oral at bedtime    MEDICATIONS  (PRN):      I&O's Summary    10 Mar 2021 07:01  -  11 Mar 2021 07:00  --------------------------------------------------------  IN: 100 mL / OUT: 200 mL / NET: -100 mL        Vital Signs Last 24 Hrs  T(C): 36.9 (11 Mar 2021 04:08), Max: 37 (10 Mar 2021 19:59)  T(F): 98.4 (11 Mar 2021 04:08), Max: 98.6 (10 Mar 2021 19:59)  HR: 98 (11 Mar 2021 04:08) (98 - 101)  BP: 155/74 (11 Mar 2021 04:08) (145/71 - 155/74)  BP(mean): --  RR: 19 (11 Mar 2021 04:08) (18 - 20)  SpO2: 99% (11 Mar 2021 04:08) (98% - 100%)    =================PHYSICAL EXAM=================    GENERAL: Laying comfortably, NAD  EYES: EOMI, PERRL, no scleral icterus  NECK: No JVD  LUNG: Clear to auscultation bilaterally; No wheeze, crackles or rhonci  HEART: Regular rate and rhythm; No murmurs, rubs, or gallops  ABDOMEN: Soft, Nontender, Nondistended  EXTREMITIES:  Cogwheel rigidity  PSYCH: AAOx0, responsive to verbal stimuli, states she is [place]  NEUROLOGY: non-focal, strength 5/5 in all extremities, sensation intact  SKIN: No rashes or lesions    =================================================    LABS:                        12.8   8.86  )-----------( 206      ( 11 Mar 2021 06:52 )             38.8     Auto Eosinophil # x     / Auto Eosinophil % x     / Auto Neutrophil # x     / Auto Neutrophil % x     / BANDS % x                            12.9   9.13  )-----------( 216      ( 10 Mar 2021 08:52 )             39.3     Auto Eosinophil # 0.04  / Auto Eosinophil % 0.4   / Auto Neutrophil # 5.97  / Auto Neutrophil % 65.5  / BANDS % x                            13.5   10.10 )-----------( 218      ( 09 Mar 2021 23:56 )             41.9     Auto Eosinophil # 0.13  / Auto Eosinophil % 1.3   / Auto Neutrophil # 5.93  / Auto Neutrophil % 58.7  / BANDS % x        0311    145  |  107  |  22  ----------------------------<  89  3.7   |  24  |  0.65  0310    139  |  106  |  18  ----------------------------<  82  4.2   |  21<L>  |  0.66      141  |  103  |  21  ----------------------------<  92  3.9   |  25  |  0.79    Ca    10.0      11 Mar 2021 06:43  Mg     2.2     03-11  Phos  2.7     03-11  TPro  7.6  /  Alb  4.1  /  TBili  0.5  /  DBili  x   /  AST  14  /  ALT  8<L>  /  AlkPhos  51  03-10  TPro  7.8  /  Alb  4.4  /  TBili  0.6  /  DBili  x   /  AST  11  /  ALT  8<L>  /  AlkPhos  50  03-09          Urinalysis Basic - ( 10 Mar 2021 03:08 )    Color: Yellow / Appearance: Clear / S.050 / pH: x  Gluc: x / Ketone: Small  / Bili: Negative / Urobili: Negative   Blood: x / Protein: Trace / Nitrite: Positive   Leuk Esterase: Negative / RBC: 3 /hpf / WBC 3 /HPF   Sq Epi: x / Non Sq Epi: 1 / Bacteria: Few            RADIOLOGY & ADDITIONAL TESTS:    Imaging Personally Reviewed:    Consultant(s) Notes Reviewed:      Care Discussed with Consultants/Other Providers:

## 2021-03-11 NOTE — DISCHARGE NOTE NURSING/CASE MANAGEMENT/SOCIAL WORK - PATIENT PORTAL LINK FT
You can access the FollowMyHealth Patient Portal offered by [facility] by registering at the following website: [email or web address]. By joining [email or web address]’s FollowMyHealth portal, you will also be able to view your health information using other applications (apps) compatible with our system.

## 2021-03-11 NOTE — DISCHARGE NOTE PROVIDER - NSDCCPCAREPLAN_GEN_ALL_CORE_FT
PRINCIPAL DISCHARGE DIAGNOSIS  Diagnosis: Weakness  Assessment and Plan of Treatment: You came into the hospital because you were more tired. Blood tests were all normal. You had bacteria in your urine. You were treated with antibiotics. Your symptoms improved. One of your medications, Seroquel, was held because it may cause sedation. Please follow up with your PCP and Neurologist 1-2 weeks after discharge. Please come back to the emergency room if you have fever, chills, inability to walk or increased tiredness.       PRINCIPAL DISCHARGE DIAGNOSIS  Diagnosis: Weakness  Assessment and Plan of Treatment: You came into the hospital because you were more tired. Blood tests were all normal. You had bacteria in your urine. You were treated with antibiotics. Your symptoms improved. One of your medications, Seroquel, was held because it may cause sedation. Please follow up with your PCP and Neurologist 1-2 weeks after discharge. Please come back to the emergency room if you have fever, chills, inability to walk or increased tiredness.      SECONDARY DISCHARGE DIAGNOSES  Diagnosis: Acute UTI  Assessment and Plan of Treatment: You had a urinary tract infection while you were in the hospital. you were treated with antibiotics while you were here. We sent a prescription to the pharmacy. Please be sure to take all of your medications as prescribed. Take all of your medications as prescribed.

## 2021-03-11 NOTE — PROGRESS NOTE ADULT - PROBLEM SELECTOR PLAN 1
Hypoactive delirium. Possibly 2/2 UTI vs multiple sedating medications vs progression of parkinsons  --RESOLVED - back to baseline per son  --c/w Ceftriaxone x 3 days (3/10- )  --hold sedating medications for now  --TSH, B12, HIV, WNL  --BCx NGTD  --f/u UCx

## 2021-03-12 RX ORDER — CEFPODOXIME PROXETIL 100 MG
1 TABLET ORAL
Qty: 2 | Refills: 0
Start: 2021-03-12 | End: 2021-03-12

## 2021-03-15 LAB
CULTURE RESULTS: SIGNIFICANT CHANGE UP
CULTURE RESULTS: SIGNIFICANT CHANGE UP
SPECIMEN SOURCE: SIGNIFICANT CHANGE UP
SPECIMEN SOURCE: SIGNIFICANT CHANGE UP

## 2023-09-22 NOTE — DISCHARGE NOTE NURSING/CASE MANAGEMENT/SOCIAL WORK - NSSCTYPOFSERV_GEN_ALL_CORE
Pt informed of below.  Scripts sent as below.  She is still on augmentin from ED and has a couple of days left.  Advised should stop augmentin and start new regimen as below. Will keep upcoming appt with GI and request that her  be tested.  Agrees to plan    Visiting RN and P/T services

## 2024-11-27 NOTE — PROGRESS NOTE ADULT - PROBLEM SELECTOR PLAN 2
Medication: METFORMIN 500MG TABLETS  passed protocol.   Last office visit date: 09/19/24  Next appointment scheduled?: No;    Number of refills given: 3    
--hold sedating medications for now  -c/w home amantadine, sinemet, entacapone